# Patient Record
Sex: FEMALE | Race: WHITE | NOT HISPANIC OR LATINO
[De-identification: names, ages, dates, MRNs, and addresses within clinical notes are randomized per-mention and may not be internally consistent; named-entity substitution may affect disease eponyms.]

---

## 2021-04-01 PROBLEM — Z00.00 ENCOUNTER FOR PREVENTIVE HEALTH EXAMINATION: Status: ACTIVE | Noted: 2021-04-01

## 2021-04-21 ENCOUNTER — NON-APPOINTMENT (OUTPATIENT)
Age: 31
End: 2021-04-21

## 2021-04-26 ENCOUNTER — APPOINTMENT (OUTPATIENT)
Dept: BREAST CENTER | Facility: CLINIC | Age: 31
End: 2021-04-26
Payer: COMMERCIAL

## 2021-04-26 DIAGNOSIS — Z87.09 PERSONAL HISTORY OF OTHER DISEASES OF THE RESPIRATORY SYSTEM: ICD-10-CM

## 2021-04-26 DIAGNOSIS — Z78.9 OTHER SPECIFIED HEALTH STATUS: ICD-10-CM

## 2021-04-26 DIAGNOSIS — Z87.39 PERSONAL HISTORY OF OTHER DISEASES OF THE MUSCULOSKELETAL SYSTEM AND CONNECTIVE TISSUE: ICD-10-CM

## 2021-04-26 PROCEDURE — 99072 ADDL SUPL MATRL&STAF TM PHE: CPT

## 2021-04-26 PROCEDURE — 99203 OFFICE O/P NEW LOW 30 MIN: CPT

## 2021-04-30 NOTE — PAST MEDICAL HISTORY
[Menarche Age ____] : age at menarche was [unfilled] [Approximately ___] : the LMP was approximately [unfilled] [Regular Cycle Intervals] : have been regular [Total Preg ___] : G[unfilled] [Live Births ___] : P[unfilled]  [Age At Live Birth ___] : Age at live birth: [unfilled] [History of Hormone Replacement Treatment] : has no history of hormone replacement treatment [FreeTextEntry6] : none [FreeTextEntry7] : none [FreeTextEntry8] : 9-months

## 2021-04-30 NOTE — HISTORY OF PRESENT ILLNESS
[FreeTextEntry1] : Tyree is a 30 year old female, self referred, who presents for initial evaluation regarding interest in genetic testing. Pt denies masses, lesions, discharge, or other breast complaints. \par \par PEARL lifetime risk is 25.8% \par \par Family history is significant for ovarian cancer in her mother at age 39; her oldest sister had breast cancer dx at age 29 w/ recurrence in her liver about 5 years later (s/p b/l mastectomy, had metastasized to her liver); this sister completed genetic testing >10 years ago; another one of her sisters was also tested at that time, negative. Her m-aunt was dx with ovarian cancer, age unknown. P-1st cousin w/ breast cancer in her mid 30's.\par \par Discussed patients high risk status and recommendations for close surveillance. Patient understands and would like to be followed closely. Discussed benefits of surveillance and well as implication of the sensitivity of breast MRI. Patient understands however she has metal rods in her back, therefore will forego MRIs. Discussed importance and implication of genetic testing in regards to her family history and offspring. Patient would like to go forward with genetic testing.\par \par Has a GYN, unsure of their name, has not been in a while\par PCP is Dr. Allyson Vinson\par \par COVID-19 vaccination: 1st was last week\par

## 2021-05-03 ENCOUNTER — OUTPATIENT (OUTPATIENT)
Dept: OUTPATIENT SERVICES | Facility: HOSPITAL | Age: 31
LOS: 1 days | End: 2021-05-03
Payer: COMMERCIAL

## 2021-05-03 ENCOUNTER — RESULT REVIEW (OUTPATIENT)
Age: 31
End: 2021-05-03

## 2021-05-03 ENCOUNTER — APPOINTMENT (OUTPATIENT)
Dept: MAMMOGRAPHY | Facility: HOSPITAL | Age: 31
End: 2021-05-03
Payer: COMMERCIAL

## 2021-05-03 ENCOUNTER — APPOINTMENT (OUTPATIENT)
Dept: ULTRASOUND IMAGING | Facility: HOSPITAL | Age: 31
End: 2021-05-03
Payer: COMMERCIAL

## 2021-05-03 PROCEDURE — 76641 ULTRASOUND BREAST COMPLETE: CPT

## 2021-05-03 PROCEDURE — 77066 DX MAMMO INCL CAD BI: CPT | Mod: 26

## 2021-05-03 PROCEDURE — 76641 ULTRASOUND BREAST COMPLETE: CPT | Mod: 26,LT

## 2021-05-03 PROCEDURE — 77066 DX MAMMO INCL CAD BI: CPT

## 2021-05-03 PROCEDURE — 76641 ULTRASOUND BREAST COMPLETE: CPT | Mod: 26,RT

## 2021-05-04 ENCOUNTER — NON-APPOINTMENT (OUTPATIENT)
Age: 31
End: 2021-05-04

## 2021-05-06 ENCOUNTER — NON-APPOINTMENT (OUTPATIENT)
Age: 31
End: 2021-05-06

## 2021-12-03 ENCOUNTER — NON-APPOINTMENT (OUTPATIENT)
Age: 31
End: 2021-12-03

## 2021-12-06 ENCOUNTER — APPOINTMENT (OUTPATIENT)
Dept: BREAST CENTER | Facility: CLINIC | Age: 31
End: 2021-12-06
Payer: COMMERCIAL

## 2021-12-06 VITALS — WEIGHT: 178 LBS | HEART RATE: 81 BPM | HEIGHT: 71 IN | BODY MASS INDEX: 24.92 KG/M2 | OXYGEN SATURATION: 97 %

## 2021-12-06 PROCEDURE — 99213 OFFICE O/P EST LOW 20 MIN: CPT

## 2021-12-07 NOTE — DATA REVIEWED
[No studies available for review at this time.] : No studies available for review at this time. [FreeTextEntry1] : Genetics: \par 04/26/21: [facility] BRCA-, VUS in CD[facility]. \par \par Data Reviewed ([facility])-\par 09/13/21: B/L MG & US- Extremely dense. L 6:00 microcalcs, benign. US- WNL. BIRADS 2 \par

## 2021-12-07 NOTE — HISTORY OF PRESENT ILLNESS
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 Pt denies masses, lesions, discharge, or other breast complaints. \par \par PEARL lifetime risk is 25.8% \par \par

## 2022-01-24 ENCOUNTER — RESULT REVIEW (OUTPATIENT)
Age: 32
End: 2022-01-24

## 2022-05-17 ENCOUNTER — NON-APPOINTMENT (OUTPATIENT)
Age: 32
End: 2022-05-17

## 2022-05-17 ENCOUNTER — RESULT REVIEW (OUTPATIENT)
Age: 32
End: 2022-05-17

## 2022-05-17 ENCOUNTER — APPOINTMENT (OUTPATIENT)
Dept: MAMMOGRAPHY | Facility: HOSPITAL | Age: 32
End: 2022-05-17
Payer: COMMERCIAL

## 2022-05-17 ENCOUNTER — OUTPATIENT (OUTPATIENT)
Dept: OUTPATIENT SERVICES | Facility: HOSPITAL | Age: 32
LOS: 1 days | End: 2022-05-17
Payer: COMMERCIAL

## 2022-05-17 ENCOUNTER — APPOINTMENT (OUTPATIENT)
Dept: ULTRASOUND IMAGING | Facility: HOSPITAL | Age: 32
End: 2022-05-17
Payer: COMMERCIAL

## 2022-05-17 PROCEDURE — G0279: CPT

## 2022-05-17 PROCEDURE — G0279: CPT | Mod: 26

## 2022-05-17 PROCEDURE — 77066 DX MAMMO INCL CAD BI: CPT

## 2022-05-17 PROCEDURE — 77066 DX MAMMO INCL CAD BI: CPT | Mod: 26

## 2022-05-17 PROCEDURE — 76641 ULTRASOUND BREAST COMPLETE: CPT | Mod: 26,50

## 2022-05-17 PROCEDURE — 76641 ULTRASOUND BREAST COMPLETE: CPT

## 2022-05-23 ENCOUNTER — APPOINTMENT (OUTPATIENT)
Dept: BREAST CENTER | Facility: CLINIC | Age: 32
End: 2022-05-23
Payer: COMMERCIAL

## 2022-05-23 ENCOUNTER — TRANSCRIPTION ENCOUNTER (OUTPATIENT)
Age: 32
End: 2022-05-23

## 2022-05-23 VITALS — BODY MASS INDEX: 24.92 KG/M2 | WEIGHT: 178 LBS | HEART RATE: 78 BPM | HEIGHT: 71 IN | OXYGEN SATURATION: 98 %

## 2022-05-23 DIAGNOSIS — F90.9 ATTENTION-DEFICIT HYPERACTIVITY DISORDER, UNSPECIFIED TYPE: ICD-10-CM

## 2022-05-23 DIAGNOSIS — Z80.41 FAMILY HISTORY OF MALIGNANT NEOPLASM OF OVARY: ICD-10-CM

## 2022-05-23 PROCEDURE — 99213 OFFICE O/P EST LOW 20 MIN: CPT

## 2022-05-23 RX ORDER — DEXTROAMPHETAMINE SACCHARATE, AMPHETAMINE ASPARTATE, DEXTROAMPHETAMINE SULFATE, AND AMPHETAMINE SULFATE 3.75; 3.75; 3.75; 3.75 MG/1; MG/1; MG/1; MG/1
TABLET ORAL
Refills: 0 | Status: ACTIVE | COMMUNITY

## 2022-05-23 NOTE — HISTORY OF PRESENT ILLNESS
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  Denies palpable abnormalities, no skin changes/dimpling, no nipple discharge bilaterally. \par \par PEARL lifetime risk is 25.8% \par \par

## 2022-05-23 NOTE — DATA REVIEWED
[No studies available for review at this time.] : No studies available for review at this time. [FreeTextEntry1] : 04/26/21: [facility] BRCA-, VUS in CDH1. \par \par 09/13/21: B/L MG & US- Extremely dense. L 6:00 microcalcs, benign. US- WNL. BIRADS 2 \par \par 5/17/22 ([facility]) Contrast mammo & b/l US: showing breasts are extremely dense, No imaging evidence for malignancy. RECOMMENDATION: Clinical follow up. BI-RADS 1- Negative

## 2023-05-02 ENCOUNTER — APPOINTMENT (OUTPATIENT)
Dept: MAMMOGRAPHY | Facility: HOSPITAL | Age: 33
End: 2023-05-02

## 2023-05-02 ENCOUNTER — OUTPATIENT (OUTPATIENT)
Dept: OUTPATIENT SERVICES | Facility: HOSPITAL | Age: 33
LOS: 1 days | End: 2023-05-02
Payer: COMMERCIAL

## 2023-05-02 ENCOUNTER — RESULT REVIEW (OUTPATIENT)
Age: 33
End: 2023-05-02

## 2023-05-02 ENCOUNTER — APPOINTMENT (OUTPATIENT)
Dept: ULTRASOUND IMAGING | Facility: HOSPITAL | Age: 33
End: 2023-05-02

## 2023-05-02 PROCEDURE — 76641 ULTRASOUND BREAST COMPLETE: CPT | Mod: 26,50

## 2023-05-02 PROCEDURE — 77066 DX MAMMO INCL CAD BI: CPT | Mod: 26

## 2023-05-02 PROCEDURE — G0279: CPT | Mod: 26

## 2023-05-12 ENCOUNTER — APPOINTMENT (OUTPATIENT)
Dept: BREAST CENTER | Facility: CLINIC | Age: 33
End: 2023-05-12
Payer: COMMERCIAL

## 2023-05-12 ENCOUNTER — RESULT REVIEW (OUTPATIENT)
Age: 33
End: 2023-05-12

## 2023-05-12 VITALS
DIASTOLIC BLOOD PRESSURE: 73 MMHG | HEIGHT: 71 IN | SYSTOLIC BLOOD PRESSURE: 110 MMHG | HEART RATE: 65 BPM | WEIGHT: 199 LBS | BODY MASS INDEX: 27.86 KG/M2 | OXYGEN SATURATION: 99 %

## 2023-05-12 DIAGNOSIS — Z91.89 OTHER SPECIFIED PERSONAL RISK FACTORS, NOT ELSEWHERE CLASSIFIED: ICD-10-CM

## 2023-05-12 DIAGNOSIS — Z80.3 FAMILY HISTORY OF MALIGNANT NEOPLASM OF BREAST: ICD-10-CM

## 2023-05-12 PROCEDURE — 99213 OFFICE O/P EST LOW 20 MIN: CPT

## 2023-05-12 NOTE — REVIEW OF SYSTEMS
[Fever] : no fever [Chills] : no chills [Breast Pain] : breast pain [Breast Lump] : no breast lump [Negative] : Heme/Lymph [de-identified] : Breast tenderness, bilateral, since her mammogram

## 2023-05-12 NOTE — PAST MEDICAL HISTORY
[Menarche Age ____] : age at menarche was [unfilled] [Approximately ___] : the LMP was approximately [unfilled] [Regular Cycle Intervals] : have been regular [Total Preg ___] : G[unfilled] [Live Births ___] : P[unfilled]  [Age At Live Birth ___] : Age at live birth: [unfilled] [Definite ___ (Date)] : the last menstrual period was [unfilled] [History of Hormone Replacement Treatment] : has no history of hormone replacement treatment [FreeTextEntry6] : none [FreeTextEntry7] : none [FreeTextEntry8] : 9-months

## 2023-05-12 NOTE — HISTORY OF PRESENT ILLNESS
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  Denies palpable abnormalities, no skin changes/dimpling, no nipple discharge bilaterally. \par \par PEARL lifetime risk is 25.8% \par \par The patient is unable to undergo high risk screening MRI's due to spinal fusion; she is getting contrast mammograms for enhanced screening. \par \par

## 2023-05-12 NOTE — ASSESSMENT
[FreeTextEntry1] : 33 yo female presents for high risk screening. Discussed with the patient the implications of their lifetime risk, which is considered to be elevated for the development of breast cancer over the span of their lifetime. It was explained that it is recommended that they undergo high risk screening surveillance to include biannual radiological screening exams with a mammogram and screening MRI. Reviewed that given she is unable to complete MRI's will continue with the contrast enhanced mammograms in addition to the ultrasounds. \par \par Discussed with the patient that mastalgia is not a common sign of breast cancer. I reviewed keeping a symptom calendar, the use of OTC Ibuprofen, warm/cold compresses, wearing a sports bra.\par \par Discussed self breast exams with the patient. Reviewed the importance of breast self awareness, knowing her baseline, and informing me if she notices anything new or changes. She verbalized understanding. \par \par Patient will get her next breast exam with her GYN in the Fall, and RTC in 1 year. She verbalized understanding and is in agreement with the plan.

## 2023-05-12 NOTE — DATA REVIEWED
[No studies available for review at this time.] : No studies available for review at this time. [FreeTextEntry1] : 04/26/21: [facility] BRCA-, VUS in CDH1. \par \par 09/13/21: B/L MG & US- Extremely dense. L 6:00 microcalcs, benign. US- WNL. BIRADS 2 \par \par 5/17/22 ([facility]) Contrast mammo & b/l US: showing breasts are extremely dense, No imaging evidence for malignancy. RECOMMENDATION: Clinical follow up. BI-RADS 1- Negative\par \par 5/2/23 ([facility]) bilateral diag mammo and US:  heterogeneously dense. No mammographic or sonographic evidence of malignancy. BI-RADS 1.

## 2024-05-02 ENCOUNTER — NON-APPOINTMENT (OUTPATIENT)
Age: 34
End: 2024-05-02

## 2024-05-14 ENCOUNTER — OUTPATIENT (OUTPATIENT)
Dept: OUTPATIENT SERVICES | Facility: HOSPITAL | Age: 34
LOS: 1 days | End: 2024-05-14

## 2024-05-14 ENCOUNTER — APPOINTMENT (OUTPATIENT)
Dept: MAMMOGRAPHY | Facility: HOSPITAL | Age: 34
End: 2024-05-14
Payer: COMMERCIAL

## 2024-05-14 ENCOUNTER — RESULT REVIEW (OUTPATIENT)
Age: 34
End: 2024-05-14

## 2024-05-14 PROCEDURE — G0279: CPT

## 2024-05-14 PROCEDURE — 77066 DX MAMMO INCL CAD BI: CPT

## 2024-05-14 PROCEDURE — 76641 ULTRASOUND BREAST COMPLETE: CPT | Mod: 26,50

## 2024-05-14 PROCEDURE — 77066 DX MAMMO INCL CAD BI: CPT | Mod: 26

## 2024-05-14 PROCEDURE — 76641 ULTRASOUND BREAST COMPLETE: CPT

## 2024-05-14 PROCEDURE — G0279: CPT | Mod: 26

## 2024-05-16 ENCOUNTER — NON-APPOINTMENT (OUTPATIENT)
Age: 34
End: 2024-05-16

## 2024-05-21 ENCOUNTER — APPOINTMENT (OUTPATIENT)
Dept: BREAST CENTER | Facility: CLINIC | Age: 34
End: 2024-05-21

## 2024-05-22 ENCOUNTER — TRANSCRIPTION ENCOUNTER (OUTPATIENT)
Age: 34
End: 2024-05-22